# Patient Record
Sex: FEMALE | Race: WHITE | NOT HISPANIC OR LATINO | Employment: STUDENT | URBAN - METROPOLITAN AREA
[De-identification: names, ages, dates, MRNs, and addresses within clinical notes are randomized per-mention and may not be internally consistent; named-entity substitution may affect disease eponyms.]

---
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
## 2020-09-15 ENCOUNTER — OFFICE VISIT (OUTPATIENT)
Dept: URGENT CARE | Facility: CLINIC | Age: 20
End: 2020-09-15
Payer: COMMERCIAL

## 2020-09-15 VITALS
HEART RATE: 125 BPM | TEMPERATURE: 98 F | DIASTOLIC BLOOD PRESSURE: 66 MMHG | SYSTOLIC BLOOD PRESSURE: 117 MMHG | OXYGEN SATURATION: 96 %

## 2020-09-15 DIAGNOSIS — N39.0 URINARY TRACT INFECTION WITH HEMATURIA, SITE UNSPECIFIED: Primary | ICD-10-CM

## 2020-09-15 DIAGNOSIS — R31.9 URINARY TRACT INFECTION WITH HEMATURIA, SITE UNSPECIFIED: Primary | ICD-10-CM

## 2020-09-15 LAB
B-HCG UR QL: NEGATIVE
BILIRUB UR QL STRIP: NEGATIVE
CTP QC/QA: YES
GLUCOSE UR QL STRIP: NEGATIVE
KETONES UR QL STRIP: POSITIVE
LEUKOCYTE ESTERASE UR QL STRIP: POSITIVE
PH, POC UA: 5.5 (ref 5–8)
POC BLOOD, URINE: POSITIVE
POC NITRATES, URINE: NEGATIVE
PROT UR QL STRIP: POSITIVE
SP GR UR STRIP: 1.02 (ref 1–1.03)
UROBILINOGEN UR STRIP-ACNC: NORMAL (ref 0.1–1.1)

## 2020-09-15 PROCEDURE — 99214 PR OFFICE/OUTPT VISIT, EST, LEVL IV, 30-39 MIN: ICD-10-PCS | Mod: 25,S$GLB,, | Performed by: PHYSICIAN ASSISTANT

## 2020-09-15 PROCEDURE — 81003 URINALYSIS AUTO W/O SCOPE: CPT | Mod: QW,S$GLB,, | Performed by: PHYSICIAN ASSISTANT

## 2020-09-15 PROCEDURE — 81003 POCT URINALYSIS, DIPSTICK, AUTOMATED, W/O SCOPE: ICD-10-PCS | Mod: QW,S$GLB,, | Performed by: PHYSICIAN ASSISTANT

## 2020-09-15 PROCEDURE — 99214 OFFICE O/P EST MOD 30 MIN: CPT | Mod: 25,S$GLB,, | Performed by: PHYSICIAN ASSISTANT

## 2020-09-15 RX ORDER — CIPROFLOXACIN 500 MG/1
500 TABLET ORAL 2 TIMES DAILY
Qty: 14 TABLET | Refills: 0 | Status: SHIPPED | OUTPATIENT
Start: 2020-09-15 | End: 2020-09-22

## 2020-09-15 NOTE — PATIENT INSTRUCTIONS
"- Rest.  - Drink plenty of fluids.  - Take Tylenol and/or Ibuprofen as directed as needed for fever/pain.  Do not take more than the recommended dose.  - follow up with your PCP within the next 1-2 weeks as needed.  - You must understand that you have received an Urgent Care treatment only and that you may be released before all of your medical problems are known or treated.   - You, the patient, will arrange for follow up care as instructed.   - If your condition worsens or fails to improve we recommend that you receive another evaluation at the ER immediately or contact your PCP to discuss your concerns.   - You can call [phone] or [phone] to help schedule an appointment with the appropriate provider.      Bladder Infection, Female (Adult)    Urine is normally doesn't have any bacteria in it. But bacteria can get into the urinary tract from the skin around the rectum. Or they can travel in the blood from elsewhere in the body. Once they are in your urinary tract, they can cause infection in the urethra (urethritis), the bladder (cystitis), or the kidneys (pyelonephritis).  The most common place for an infection is in the bladder. This is called a bladder infection. This is one of the most common infections in women. Most bladder infections are easily treated. They are not serious unless the infection spreads to the kidney.  The phrases "bladder infection," "UTI," and "cystitis" are often used to describe the same thing. But they are not always the same. Cystitis is an inflammation of the bladder. The most common cause of cystitis is an infection.  Symptoms  The infection causes inflammation in the urethra and bladder. This causes many of the symptoms. The most common symptoms of a bladder infection are:  · Pain or burning when urinating  · Having to urinate more often than usual  · Urgent need to urinate  · Only a small amount of urine comes out  · Blood in urine  · Abdominal discomfort. This is " usually in the lower abdomen above the pubic bone.  · Cloudy urine  · Strong- or bad-smelling urine  · Unable to urinate (urinary retention)  · Unable to hold urine in (urinary incontinence)  · Fever  · Loss of appetite  · Confusion (in older adults)  Causes  Bladder infections are not contagious. You can't get one from someone else, from a toilet seat, or from sharing a bath.  The most common cause of bladder infections is bacteria from the bowels. The bacteria get onto the skin around the opening of the urethra. From there, they can get into the urine and travel up to the bladder, causing inflammation and infection. This usually happens because of:  · Wiping improperly after urinating. Always wipe from front to back.  · Bowel incontinence  · Pregnancy  · Procedures such as having a catheter inserted  · Older age  · Not emptying your bladder. This can allow bacteria a chance to grow in your urine.  · Dehydration  · Constipation  · Sex  · Use of a diaphragm for birth control   Treatment  Bladder infections are diagnosed by a urine test. They are treated with antibiotics and usually clear up quickly without complications. Treatment helps prevent a more serious kidney infection.  Medicines  Medicines can help in the treatment of a bladder infection:  · Take antibiotics until they are used up, even if you feel better. It is important to finish them to make sure the infection has cleared.  · You can use acetaminophen or ibuprofen for pain, fever, or discomfort, unless another medicine was prescribed. If you have chronic liver or kidney disease, talk with your healthcare provider before using these medicines. Also talk with your provider if you've ever had a stomach ulcer or gastrointestinal bleeding, or are taking blood-thinner medicines.  · If you are given phenazopydridine to reduce burning with urination, it will cause your urine to become a bright orange color. This can stain clothing.  Care and prevention  These  self-care steps can help prevent future infections:  · Drink plenty of fluids to prevent dehydration and flush out your bladder. Do this unless you must restrict fluids for other health reasons, or your doctor told you not to.  · Proper cleaning after going to the bathroom is important. Wipe from front to back after using the toilet to prevent the spread of bacteria.  · Urinate more often. Don't try to hold urine in for a long time.  · Wear loose-fitting clothes and cotton underwear. Avoid tight-fitting pants.  · Improve your diet and prevent constipation. Eat more fresh fruit and vegetables, and fiber, and less junk and fatty foods.  · Avoid sex until your symptoms are gone.  · Avoid caffeine, alcohol, and spicy foods. These can irritate your bladder.  · Urinate right after intercourse to flush out your bladder.  · If you use birth control pills and have frequent bladder infections, discuss it with your doctor.  Follow-up care  Call your healthcare provider if all symptoms are not gone after 3 days of treatment. This is especially important if you have repeat infections.  If a culture was done, you will be told if your treatment needs to be changed. If directed, you can call to find out the results.  If X-rays were done, you will be told if the results will affect your treatment.  Call 911  Call 911 if any of the following occur:  · Trouble breathing  · Hard to wake up or confusion  · Fainting or loss of consciousness  · Rapid heart rate  When to seek medical advice  Call your healthcare provider right away if any of these occur:  · Fever of 100.4ºF (38.0ºC) or higher, or as directed by your healthcare provider  · Symptoms are not better by the third day of treatment  · Back or belly (abdominal) pain that gets worse  · Repeated vomiting, or unable to keep medicine down  · Weakness or dizziness  · Vaginal discharge  · Pain, redness, or swelling in the outer vaginal area (labia)  Date Last Reviewed: 10/1/2016  ©  9706-2089 [employer]. [address] [place] [address], [place] [address]. All rights reserved. This information is not intended as a substitute for professional medical care. Always follow your healthcare professional's instructions.        Urinary Tract Infections in Women    Urinary tract infections (UTIs) are most often caused by bacteria (germs). These bacteria enter the urinary tract. The bacteria may come from outside the body. Or they may travel from the skin outside the rectum or vagina into the urethra. Female anatomy makes it easy for bacteria from the bowel to enter a womans urinary tract, which is the most common source of UTI. This means women develop UTIs more often than men. Pain in or around the urinary tract is a common UTI symptom. But the only way to know for sure if you have a UTI for the healthcare provider to test your urine. The two tests that may be done are the urinalysis and urine culture.  Types of UTIs  · Cystitis: A bladder infection (cystitis) is the most common UTI in women. You may have urgent or frequent urination. You may also have pain, burning when you urinate, and bloody urine.  · Urethritis: This is an inflamed urethra, which is the tube that carries urine from the bladder to outside the body. You may have lower stomach or back pain. You may also have urgent or frequent urination.  · Pyelonephritis: This is a kidney infection. If not treated, it can be serious and damage your kidneys. In severe cases, you may be hospitalized. You may have a fever and lower back pain.  Medicines to treat a UTI  Most UTIs are treated with antibiotics. These kill the bacteria. The length of time you need to take them depends on the type of infection. It may be as short as 3 days. If you have repeated UTIs, a low-dose antibiotic may be needed for several months. Take antibiotics exactly as directed. Dont stop taking them until all of the medicine is gone. If you stop taking the antibiotic  too soon, the infection may not go away, and you may develop a resistance to the antibiotic. This can make it much harder to treat.  Lifestyle changes to treat and prevent UTIs  The lifestyle changes below will help get rid of your UTI. They may also help prevent future UTIs.  · Drink plenty of fluids. This includes water, juice, or other caffeine-free drinks. Fluids help flush bacteria out of your body.  · Empty your bladder. Always empty your bladder when you feel the urge to urinate. And always urinate before going to sleep. Urine that stays in your bladder can lead to infection. Try to urinate before and after sex as well.  · Practice good personal hygiene. Wipe yourself from front to back after using the toilet. This helps keep bacteria from getting into the urethra.  · Use condoms during sex. These help prevent UTIs caused by sexually transmitted bacteria. Also, avoid using spermicides during sex. These can increase the risk of UTIs. Choose other forms of birth control instead. For women who tend to get UTIs after sex, a low-dose of a preventive antibiotic may be used. Be sure to discuss this option with your healthcare provider.  · Follow up with your healthcare provider as directed. He or she may test to make sure the infection has cleared. If needed, more treatment may be started.  Date Last Reviewed: 1/1/2017  © 1263-5792 [employer]. [address] [place] [address], [place] [address]. All rights reserved. This information is not intended as a substitute for professional medical care. Always follow your healthcare professional's instructions.

## 2020-09-15 NOTE — PROGRESS NOTES
Subjective:       Patient ID: Reg Hernandez is a 19 y.o. female.    Vitals:  temperature is 97.9 °F (36.6 °C). Her blood pressure is 117/66 and her pulse is 125 (abnormal). Her oxygen saturation is 96%.     Chief Complaint: Urinary Tract Infection    Patient presents the clinic with a complaint of urinary frequency and right side and flank pain that began yesterday evening.  Patient states approximately 1 month ago she was treated for similar symptoms with antibiotics.  Her symptoms resolved but then returned.  She reports feeling hot and cold last night, but never checked her temperature.    Urinary Tract Infection   This is a new problem. The current episode started yesterday. The problem has been gradually worsening. Associated symptoms include chills, flank pain and frequency. Pertinent negatives include no hematuria, nausea, urgency, vomiting or rash. She has tried NSAIDs and acetaminophen for the symptoms. The treatment provided no relief.       Constitution: Positive for chills. Negative for fever.   Neck: Negative for painful lymph nodes.   Gastrointestinal: Negative for abdominal pain, nausea and vomiting.   Genitourinary: Positive for frequency and flank pain. Negative for dysuria, urgency, urine decreased, hematuria, history of kidney stones, painful menstruation, irregular menstruation, missed menses, heavy menstrual bleeding, ovarian cysts, genital trauma, vaginal pain, vaginal discharge, vaginal bleeding, vaginal odor, painful intercourse, genital sore, painful ejaculation and pelvic pain.   Musculoskeletal: Negative for trauma.   Skin: Negative for rash and lesion.   Hematologic/Lymphatic: Negative for swollen lymph nodes.       Objective:      Physical Exam   Constitutional: She is oriented to person, place, and time. She appears well-developed. No distress.   HENT:   Head: Normocephalic and atraumatic.   Eyes: Conjunctivae are normal.   Cardiovascular: Regular rhythm and normal heart sounds.  Tachycardia present. Exam reveals no gallop and no friction rub.   No murmur heard.     Comments: HR ~ 108 bpm   Pulmonary/Chest: Effort normal and breath sounds normal. No respiratory distress. She has no wheezes.   Abdominal: Soft. Bowel sounds are normal. She exhibits no distension. There is no abdominal tenderness. There is right CVA tenderness. flat abdomen  Musculoskeletal: Normal range of motion.         General: No tenderness.   Neurological: She is alert and oriented to person, place, and time.   Skin: Skin is warm, dry and not diaphoretic. Psychiatric: Her behavior is normal. Judgment and thought content normal.         Results for orders placed or performed in visit on 09/15/20   POCT urine pregnancy   Result Value Ref Range    POC Preg Test, Ur Negative Negative     Acceptable Yes    POCT Urinalysis, Dipstick, Automated, W/O Scope   Result Value Ref Range    POC Blood, Urine Positive (A) Negative    POC Bilirubin, Urine Negative Negative    POC Urobilinogen, Urine normal 0.1 - 1.1    POC Ketones, Urine Positive (A) Negative    POC Protein, Urine Positive (A) Negative    POC Nitrates, Urine Negative Negative    POC Glucose, Urine Negative Negative    pH, UA 5.5 5 - 8    POC Specific Gravity, Urine 1.025 1.003 - 1.029    POC Leukocytes, Urine Positive (A) Negative       Assessment:       1. Urinary tract infection with hematuria, site unspecified        Plan:         Urinary tract infection with hematuria, site unspecified  -     POCT urine pregnancy  -     POCT Urinalysis, Dipstick, Automated, W/O Scope  -     Culture, Urine  -     ciprofloxacin HCl (CIPRO) 500 MG tablet; Take 1 tablet (500 mg total) by mouth 2 (two) times daily. for 7 days  Dispense: 14 tablet; Refill: 0         Reg was seen today for urinary tract infection.    Diagnoses and all orders for this visit:    Urinary tract infection with hematuria, site unspecified  -     POCT urine pregnancy  -     POCT Urinalysis,  "Dipstick, Automated, W/O Scope  -     Culture, Urine  -     ciprofloxacin HCl (CIPRO) 500 MG tablet; Take 1 tablet (500 mg total) by mouth 2 (two) times daily. for 7 days      Patient Instructions   - Rest.  - Drink plenty of fluids.  - Take Tylenol and/or Ibuprofen as directed as needed for fever/pain.  Do not take more than the recommended dose.  - follow up with your PCP within the next 1-2 weeks as needed.  - You must understand that you have received an Urgent Care treatment only and that you may be released before all of your medical problems are known or treated.   - You, the patient, will arrange for follow up care as instructed.   - If your condition worsens or fails to improve we recommend that you receive another evaluation at the ER immediately or contact your PCP to discuss your concerns.   - You can call [phone] or [phone] to help schedule an appointment with the appropriate provider.      Bladder Infection, Female (Adult)    Urine is normally doesn't have any bacteria in it. But bacteria can get into the urinary tract from the skin around the rectum. Or they can travel in the blood from elsewhere in the body. Once they are in your urinary tract, they can cause infection in the urethra (urethritis), the bladder (cystitis), or the kidneys (pyelonephritis).  The most common place for an infection is in the bladder. This is called a bladder infection. This is one of the most common infections in women. Most bladder infections are easily treated. They are not serious unless the infection spreads to the kidney.  The phrases "bladder infection," "UTI," and "cystitis" are often used to describe the same thing. But they are not always the same. Cystitis is an inflammation of the bladder. The most common cause of cystitis is an infection.  Symptoms  The infection causes inflammation in the urethra and bladder. This causes many of the symptoms. The most common symptoms of a bladder infection " are:  · Pain or burning when urinating  · Having to urinate more often than usual  · Urgent need to urinate  · Only a small amount of urine comes out  · Blood in urine  · Abdominal discomfort. This is usually in the lower abdomen above the pubic bone.  · Cloudy urine  · Strong- or bad-smelling urine  · Unable to urinate (urinary retention)  · Unable to hold urine in (urinary incontinence)  · Fever  · Loss of appetite  · Confusion (in older adults)  Causes  Bladder infections are not contagious. You can't get one from someone else, from a toilet seat, or from sharing a bath.  The most common cause of bladder infections is bacteria from the bowels. The bacteria get onto the skin around the opening of the urethra. From there, they can get into the urine and travel up to the bladder, causing inflammation and infection. This usually happens because of:  · Wiping improperly after urinating. Always wipe from front to back.  · Bowel incontinence  · Pregnancy  · Procedures such as having a catheter inserted  · Older age  · Not emptying your bladder. This can allow bacteria a chance to grow in your urine.  · Dehydration  · Constipation  · Sex  · Use of a diaphragm for birth control   Treatment  Bladder infections are diagnosed by a urine test. They are treated with antibiotics and usually clear up quickly without complications. Treatment helps prevent a more serious kidney infection.  Medicines  Medicines can help in the treatment of a bladder infection:  · Take antibiotics until they are used up, even if you feel better. It is important to finish them to make sure the infection has cleared.  · You can use acetaminophen or ibuprofen for pain, fever, or discomfort, unless another medicine was prescribed. If you have chronic liver or kidney disease, talk with your healthcare provider before using these medicines. Also talk with your provider if you've ever had a stomach ulcer or gastrointestinal bleeding, or are taking  blood-thinner medicines.  · If you are given phenazopydridine to reduce burning with urination, it will cause your urine to become a bright orange color. This can stain clothing.  Care and prevention  These self-care steps can help prevent future infections:  · Drink plenty of fluids to prevent dehydration and flush out your bladder. Do this unless you must restrict fluids for other health reasons, or your doctor told you not to.  · Proper cleaning after going to the bathroom is important. Wipe from front to back after using the toilet to prevent the spread of bacteria.  · Urinate more often. Don't try to hold urine in for a long time.  · Wear loose-fitting clothes and cotton underwear. Avoid tight-fitting pants.  · Improve your diet and prevent constipation. Eat more fresh fruit and vegetables, and fiber, and less junk and fatty foods.  · Avoid sex until your symptoms are gone.  · Avoid caffeine, alcohol, and spicy foods. These can irritate your bladder.  · Urinate right after intercourse to flush out your bladder.  · If you use birth control pills and have frequent bladder infections, discuss it with your doctor.  Follow-up care  Call your healthcare provider if all symptoms are not gone after 3 days of treatment. This is especially important if you have repeat infections.  If a culture was done, you will be told if your treatment needs to be changed. If directed, you can call to find out the results.  If X-rays were done, you will be told if the results will affect your treatment.  Call 911  Call 911 if any of the following occur:  · Trouble breathing  · Hard to wake up or confusion  · Fainting or loss of consciousness  · Rapid heart rate  When to seek medical advice  Call your healthcare provider right away if any of these occur:  · Fever of 100.4ºF (38.0ºC) or higher, or as directed by your healthcare provider  · Symptoms are not better by the third day of treatment  · Back or belly (abdominal) pain that gets  worse  · Repeated vomiting, or unable to keep medicine down  · Weakness or dizziness  · Vaginal discharge  · Pain, redness, or swelling in the outer vaginal area (labia)  Date Last Reviewed: 10/1/2016  © 9631-1588 [employer] [address] [place] [address], [place] [address]. All rights reserved. This information is not intended as a substitute for professional medical care. Always follow your healthcare professional's instructions.        Urinary Tract Infections in Women    Urinary tract infections (UTIs) are most often caused by bacteria (germs). These bacteria enter the urinary tract. The bacteria may come from outside the body. Or they may travel from the skin outside the rectum or vagina into the urethra. Female anatomy makes it easy for bacteria from the bowel to enter a womans urinary tract, which is the most common source of UTI. This means women develop UTIs more often than men. Pain in or around the urinary tract is a common UTI symptom. But the only way to know for sure if you have a UTI for the healthcare provider to test your urine. The two tests that may be done are the urinalysis and urine culture.  Types of UTIs  · Cystitis: A bladder infection (cystitis) is the most common UTI in women. You may have urgent or frequent urination. You may also have pain, burning when you urinate, and bloody urine.  · Urethritis: This is an inflamed urethra, which is the tube that carries urine from the bladder to outside the body. You may have lower stomach or back pain. You may also have urgent or frequent urination.  · Pyelonephritis: This is a kidney infection. If not treated, it can be serious and damage your kidneys. In severe cases, you may be hospitalized. You may have a fever and lower back pain.  Medicines to treat a UTI  Most UTIs are treated with antibiotics. These kill the bacteria. The length of time you need to take them depends on the type of infection. It may be as short as 3 days. If you have  repeated UTIs, a low-dose antibiotic may be needed for several months. Take antibiotics exactly as directed. Dont stop taking them until all of the medicine is gone. If you stop taking the antibiotic too soon, the infection may not go away, and you may develop a resistance to the antibiotic. This can make it much harder to treat.  Lifestyle changes to treat and prevent UTIs  The lifestyle changes below will help get rid of your UTI. They may also help prevent future UTIs.  · Drink plenty of fluids. This includes water, juice, or other caffeine-free drinks. Fluids help flush bacteria out of your body.  · Empty your bladder. Always empty your bladder when you feel the urge to urinate. And always urinate before going to sleep. Urine that stays in your bladder can lead to infection. Try to urinate before and after sex as well.  · Practice good personal hygiene. Wipe yourself from front to back after using the toilet. This helps keep bacteria from getting into the urethra.  · Use condoms during sex. These help prevent UTIs caused by sexually transmitted bacteria. Also, avoid using spermicides during sex. These can increase the risk of UTIs. Choose other forms of birth control instead. For women who tend to get UTIs after sex, a low-dose of a preventive antibiotic may be used. Be sure to discuss this option with your healthcare provider.  · Follow up with your healthcare provider as directed. He or she may test to make sure the infection has cleared. If needed, more treatment may be started.  Date Last Reviewed: 1/1/2017  © 1216-1632 [employer]. [address] [place] [address], [place] [address]. All rights reserved. This information is not intended as a substitute for professional medical care. Always follow your healthcare professional's instructions.

## 2020-09-18 ENCOUNTER — TELEPHONE (OUTPATIENT)
Dept: URGENT CARE | Facility: CLINIC | Age: 20
End: 2020-09-18

## 2020-09-18 LAB
BACTERIA UR CULT: ABNORMAL
BACTERIA UR CULT: ABNORMAL
OTHER ANTIBIOTIC SUSC ISLT: ABNORMAL

## 2020-10-13 NOTE — PROGRESS NOTES
Subjective:       Reg Hernandez is a 19 y.o. female who is an established patient who was self-referred for evaluation of UTIs.      She reports frequent UTIs. +sexually active though symptoms started prior to onset of sexual activity. Denies childhood UTIs.    She was seen in 10/18 by Urgent Care NP for dysuria and UTI symptoms. No UCx was sent (UA macro +LE, RBCs). Given Macrobid x 7d.     UTI symptoms are nocturia and dysuria. She feels this happens every 2 days x 1 year.  She has symptoms currently but UA clear. Dysuria will improve with Azo and cranberry juice. Nocturia x 0 usually, on bad days x 4-5 at night, happens about one a week. Denies UI, ZITA.     Reports she has stopped baths and reduced soda intake.     PVR (bladder scan) today - 0cc    VANIA 7/19 - wnl, PVR 1cc    10/25/2020  Returns now with c/o recent UTIs. Treated for UTI in 9/20. Reports more issues with rUTIs. Sensitive to Cipro but states that did not get better. Then given Keflex from  as symptoms persisted (UCx was negative). States she had fever, pain R flank, myalgias, weak, dizzy. Some frequency, no dysuria.  Taking Uribel PRN, helps when symptoms coming on.      UCx:   8/20 - treated for UTI at outside   9/15/20 - >100k E coli (Cipro)  9/17/20 - negative (given Keflex)    The following portions of the patient's history were reviewed and updated as appropriate: allergies, current medications, past family history, past medical history, past social history, past surgical history and problem list.    Review of Systems  Constitutional: no fever or chills  ENT: no nasal congestion or sore throat  Respiratory: no cough or shortness of breath  Cardiovascular: no chest pain or palpitations  Gastrointestinal: no nausea or vomiting, tolerating diet  Genitourinary: as per HPI  Hematologic/Lymphatic: no easy bruising or lymphadenopathy  Musculoskeletal: no arthralgias or myalgias  Skin: no rashes or lesions  Neurological: no seizures or  "tremors  Behavioral/Psych: no auditory or visual hallucinations        Objective:    Vitals: Ht 5' 5" (1.651 m)   Wt 64 kg (141 lb)   BMI 23.46 kg/m²     Physical Exam   General: well developed, well nourished in no acute distress  Head: normocephalic, atraumatic  Neck: supple, trachea midline, no obvious enlargement of thyroid  HEENT: EOMI, mucus membranes moist, sclera anicteric, no hearing impairment  Lungs: symmetric expansion, non-labored breathing  Cardiovascular: regular rate and rhythm, normal pulses  Abdomen: soft, non tender, non distended, no palpable masses, no hepatosplenomegaly, no hernias, no CVA tenderness  Musculoskeletal: no peripheral edema, normal ROM in bilateral upper and lower extremities  Lymphatics: no cervical or inguinal lymphadenopathy  Skin: no rashes or lesions  Neuro: alert and oriented x 3, no gross deficits  Psych: normal judgment and insight, normal mood/affect and non-anxious  Genitourinary:   patient declined exam      Lab Review   Urine analysis today in clinic shows - no urine     Lab Results   Component Value Date    WBC 8.19 12/30/2004    HGB 12.8 04/26/2007    HCT 37.8 12/30/2004    MCV 81.3 12/30/2004     12/30/2004     No results found for: CREATININE, BUN    Imaging  NA       Assessment/Plan:      1. Recurrent UTI    - Discussed UTI prevention strategies.   - Adequate hydration.   - Double voiding. Consider timed voiding.    - Avoid constipation.   - VANIA with PVR to monitor upper tracts 7/19 - wnl   - Cystoscopy - okay to hold   - Cranberry/probiotics/D-mannose   - Call with UTI symptoms so UA/UCx can be sent.    - Discussed CT uro, cysto as further workup - will hold for now. Will proceed if UTIs return.     2. Dysuria    - Uribel PRN   - Discussed bladder irritants. Recommend elimination diet.    - PBS?         Follow up in 2-3 months         "

## 2020-10-26 ENCOUNTER — OFFICE VISIT (OUTPATIENT)
Dept: UROLOGY | Facility: CLINIC | Age: 20
End: 2020-10-26
Payer: COMMERCIAL

## 2020-10-26 VITALS — BODY MASS INDEX: 23.49 KG/M2 | WEIGHT: 141 LBS | HEIGHT: 65 IN

## 2020-10-26 DIAGNOSIS — N39.0 RECURRENT UTI: Primary | ICD-10-CM

## 2020-10-26 DIAGNOSIS — R30.0 DYSURIA: ICD-10-CM

## 2020-10-26 PROCEDURE — 99214 OFFICE O/P EST MOD 30 MIN: CPT | Mod: S$GLB,,, | Performed by: UROLOGY

## 2020-10-26 PROCEDURE — 99999 PR PBB SHADOW E&M-EST. PATIENT-LVL III: ICD-10-PCS | Mod: PBBFAC,,, | Performed by: UROLOGY

## 2020-10-26 PROCEDURE — 99214 PR OFFICE/OUTPT VISIT, EST, LEVL IV, 30-39 MIN: ICD-10-PCS | Mod: S$GLB,,, | Performed by: UROLOGY

## 2020-10-26 PROCEDURE — 3008F PR BODY MASS INDEX (BMI) DOCUMENTED: ICD-10-PCS | Mod: CPTII,S$GLB,, | Performed by: UROLOGY

## 2020-10-26 PROCEDURE — 99999 PR PBB SHADOW E&M-EST. PATIENT-LVL III: CPT | Mod: PBBFAC,,, | Performed by: UROLOGY

## 2020-10-26 PROCEDURE — 3008F BODY MASS INDEX DOCD: CPT | Mod: CPTII,S$GLB,, | Performed by: UROLOGY

## 2020-10-27 ENCOUNTER — TELEPHONE (OUTPATIENT)
Dept: UROLOGY | Facility: CLINIC | Age: 20
End: 2020-10-27

## 2020-10-27 NOTE — TELEPHONE ENCOUNTER
Uribel sent to [facility].     She can also look at goodrx.com or look on [employer] for coupon.

## 2020-10-27 NOTE — TELEPHONE ENCOUNTER
----- Message from Nancy El MA sent at 10/27/2020  2:45 PM CDT -----    ----- Message -----  From: Jaye Wallace  Sent: 10/27/2020   1:47 PM CDT  To: Ashley Alvarez Staff    Type: Patient Call Back    Who called:  Self     What is the request in detail: patient's medication is to expensive , She would like to know if it can be called to [facility] where it may be cheaper    methen-m.blue-s.phos-phsal-hyo (URIBEL) 118-10-40.8-36 mg Cap    [facility] - [address] [phone] (Phone)  [phone] (Fax)        Can the clinic reply by [facility]? No     Would the patient rather a call back or a response via [facility]?  Call     Best call back number:.[phone] (home)

## 2020-10-27 NOTE — TELEPHONE ENCOUNTER
lvm informing pt that her prescription was sent to [facility] and that she can look on [email or web address] for a savings coupon. -Nancy

## 2023-05-30 ENCOUNTER — HOSPITAL ENCOUNTER (EMERGENCY)
Facility: HOSPITAL | Age: 23
Discharge: HOME | End: 2023-05-31
Attending: EMERGENCY MEDICINE
Payer: COMMERCIAL

## 2023-05-30 DIAGNOSIS — T63.481A LOCAL REACTION TO INSECT STING, ACCIDENTAL OR UNINTENTIONAL, INITIAL ENCOUNTER: Primary | ICD-10-CM

## 2023-05-30 DIAGNOSIS — L03.116 CELLULITIS OF LEFT FOOT EXCLUDING TOES: ICD-10-CM

## 2023-05-30 DIAGNOSIS — M79.89 SWELLING OF LEFT FOOT: ICD-10-CM

## 2023-05-30 LAB
B-HCG UR QL: NEGATIVE
CTP QC/QA: YES

## 2023-05-30 PROCEDURE — 81025 URINE PREGNANCY TEST: CPT | Mod: ER | Performed by: EMERGENCY MEDICINE

## 2023-05-30 PROCEDURE — 99284 EMERGENCY DEPT VISIT MOD MDM: CPT | Mod: ER

## 2023-05-30 NOTE — Clinical Note
"Reg Palaciosr" David was seen and treated in our emergency department on 5/30/2023.  She may return to work on 06/05/2023.       If you have any questions or concerns, please don't hesitate to call.      Charlene Johnson MD"

## 2023-05-31 VITALS
DIASTOLIC BLOOD PRESSURE: 71 MMHG | TEMPERATURE: 98 F | WEIGHT: 141 LBS | HEIGHT: 65 IN | HEART RATE: 79 BPM | RESPIRATION RATE: 19 BRPM | OXYGEN SATURATION: 100 % | SYSTOLIC BLOOD PRESSURE: 110 MMHG | BODY MASS INDEX: 23.49 KG/M2

## 2023-05-31 PROCEDURE — 63600175 PHARM REV CODE 636 W HCPCS: Mod: ER | Performed by: EMERGENCY MEDICINE

## 2023-05-31 PROCEDURE — 96372 THER/PROPH/DIAG INJ SC/IM: CPT | Performed by: EMERGENCY MEDICINE

## 2023-05-31 RX ORDER — PREDNISONE 20 MG/1
40 TABLET ORAL DAILY
Qty: 10 TABLET | Refills: 0 | Status: SHIPPED | OUTPATIENT
Start: 2023-05-31 | End: 2023-06-05

## 2023-05-31 RX ORDER — LORATADINE 10 MG/1
10 TABLET ORAL EVERY MORNING
Qty: 7 TABLET | Refills: 0 | Status: SHIPPED | OUTPATIENT
Start: 2023-05-31 | End: 2023-06-07

## 2023-05-31 RX ORDER — FAMOTIDINE 20 MG/1
20 TABLET, FILM COATED ORAL 2 TIMES DAILY
Qty: 14 TABLET | Refills: 0 | Status: SHIPPED | OUTPATIENT
Start: 2023-05-31 | End: 2023-06-07

## 2023-05-31 RX ORDER — DEXAMETHASONE SODIUM PHOSPHATE 4 MG/ML
8 INJECTION, SOLUTION INTRA-ARTICULAR; INTRALESIONAL; INTRAMUSCULAR; INTRAVENOUS; SOFT TISSUE
Status: COMPLETED | OUTPATIENT
Start: 2023-05-31 | End: 2023-05-31

## 2023-05-31 RX ORDER — DOXYCYCLINE 100 MG/1
100 CAPSULE ORAL EVERY 12 HOURS
Qty: 20 CAPSULE | Refills: 0 | Status: SHIPPED | OUTPATIENT
Start: 2023-05-31 | End: 2023-06-10

## 2023-05-31 RX ADMIN — DEXAMETHASONE SODIUM PHOSPHATE 8 MG: 4 INJECTION INTRA-ARTICULAR; INTRALESIONAL; INTRAMUSCULAR; INTRAVENOUS; SOFT TISSUE at 12:05

## 2023-05-31 NOTE — ED NOTES
PT REPORTS POSSIBLY GETTING BIT BY AN ISNSECT ON YESTERDAY. DENIES PAIN TO SITE. TOP OF LEFT ANTERIOR FOOT NOTED RED AND SWOLLEN. REPORTS ICING AREA AND TAKING BENADRYL THREE TIMES TODAY.

## 2023-05-31 NOTE — ED PROVIDER NOTES
Encounter Date: 5/30/2023    SCRIBE #1 NOTE: I, RENETTA Manriquez, am scribing for, and in the presence of,  Charlene Johnson MD. I have scribed the following portions of the note - Other sections scribed: HPI, ROS, PE.     History     Chief Complaint   Patient presents with    Insect Bite     Pt has swollen L foot from insect bite yesterday; has taken 3 doses of otc Benadryl     Reg Hernandez is a 22 y.o. female, with no pertinent medical problems, who presents to the ED with acute left foot redness/swelling which started 1 day ago. Patient believes she was bitten by a sandfly. She attempted treatment with a Benadryl then took a nap. Upon waking up, pt noticed symptoms had persisted. She also noticed that toes would turn purple if not elevated. Symptoms are exacerbated with ambulation. Denies fever or itching. Pt is concerned because she stands for long periods at her job.    The history is provided by the patient. No  was used.   Review of patient's allergies indicates:   Allergen Reactions    Amoxicillin Hives     Patient is allergic to all cillins    Clindamycin      Unable to obtain      Peaches [peach (prunus persica)] Hives    Penicillins Hives     Patient is allergic to all cillins      Sulfa (sulfonamide antibiotics) Rash     Past Medical History:   Diagnosis Date    ADHD (attention deficit hyperactivity disorder)     Allergy     GERD (gastroesophageal reflux disease)     History of methicillin resistant Staph aureus     History of viral warts     on right elbow    Hypoxia     Multiple food allergies     Otitis media     Sleep apnea      Past Surgical History:   Procedure Laterality Date    ADENOIDECTOMY      TONSILLECTOMY      TYMPANOSTOMY TUBE PLACEMENT       Family History   Problem Relation Age of Onset    Hyperlipidemia Mother     Diabetes Mother      Social History     Tobacco Use    Smoking status: Every Day     Types: Vaping with nicotine    Smokeless tobacco: Never   Substance  Use Topics    Alcohol use: No    Drug use: No     Review of Systems   Constitutional:  Negative for fever.   Cardiovascular:  Negative for leg swelling.   Musculoskeletal:  Negative for arthralgias, gait problem and joint swelling.   Skin:  Positive for color change (redness).   Neurological:  Negative for weakness and numbness.   All other systems reviewed and are negative.    Physical Exam     Initial Vitals [05/30/23 2022]   BP Pulse Resp Temp SpO2   120/79 100 16 98.3 °F (36.8 °C) 98 %      MAP       --         Physical Exam    Nursing note and vitals reviewed.  Constitutional: She appears well-developed and well-nourished.   HENT:   Head: Normocephalic and atraumatic.   Right Ear: External ear normal.   Left Ear: External ear normal.   Eyes: Conjunctivae are normal.   Neck: Phonation normal. Neck supple.   Normal range of motion.  Cardiovascular:  Normal rate and intact distal pulses.           Pulmonary/Chest: Effort normal. No stridor. No respiratory distress.   Abdominal: Abdomen is soft. There is no abdominal tenderness.   Musculoskeletal:         General: No tenderness or edema.      Cervical back: Normal range of motion and neck supple.     Neurological: She is alert and oriented to person, place, and time. She has normal strength. Gait normal.   Skin: Skin is warm and dry. No rash noted.   Erythema and edema to dorsal aspect of left foot which goes up to ankle. Non tender. Normal ROM and capillary refill. Skin intact. Sensation normal. Left DP pulse 2+.   Psychiatric: She has a normal mood and affect. Her behavior is normal.       ED Course   Procedures  Labs Reviewed   POCT URINE PREGNANCY          Imaging Results    None          Medications   dexAMETHasone injection 8 mg (8 mg Intramuscular Given 5/31/23 0014)     Medical Decision Making:   History:   Old Medical Records: I decided to obtain old medical records.  Clinical Tests:   Lab Tests: Ordered and Reviewed        Scribe Attestation:   Scribe #1:  I performed the above scribed service and the documentation accurately describes the services I performed. I attest to the accuracy of the note.                 Labs Reviewed    Admission on 2023, Discharged on 2023   Component Date Value Ref Range Status    POC Preg Test, Ur 2023 Negative  Negative Final     Acceptable 2023 Yes   Final        Imaging Reviewed    Imaging Results    None         Medications given in ED    Medications   dexAMETHasone injection 8 mg (8 mg Intramuscular Given 23 0014)         Note was created using voice recognition software. Note may have occasional typographical errors that may not have been identified and edited despite good liu initial review prior to signing.    I, Charlene Johnson MD, personally performed the services described in this documentation. All medical record entries made by the scribe were at my direction and in my presence.  I have reviewed the chart and agree that the record reflects my personal performance and is accurate and complete.      Clinical Impression:   Final diagnoses:  [T63.481A] Local reaction to insect sting, accidental or unintentional, initial encounter (Primary)  [M79.89] Swelling of left foot  [L03.116] Cellulitis of left foot excluding toes        ED Disposition Condition    Discharge Stable          ED Prescriptions       Medication Sig Dispense Start Date End Date Auth. Provider    doxycycline (MONODOX) 100 MG capsule () Take 1 capsule (100 mg total) by mouth every 12 (twelve) hours. for 10 days 20 capsule 2023 6/10/2023 Charlene Johnson MD    predniSONE (DELTASONE) 20 MG tablet () Take 2 tablets (40 mg total) by mouth once daily. for 5 days 10 tablet 2023 Charlene Johnson MD    famotidine (PEPCID) 20 MG tablet () Take 1 tablet (20 mg total) by mouth 2 (two) times daily. for 7 days 14 tablet 2023 Charlene Johnson MD    loratadine (CLARITIN) 10 mg tablet  () Take 1 tablet (10 mg total) by mouth every morning. for 7 days 7 tablet 2023 Charlene Johnson MD          Follow-up Information       Follow up With Specialties Details Why Contact Info    The nearest emergency department.  Go to  As needed, If symptoms worsen     Astrid Blackwell MD Family Medicine, Wound Care Call in 1 day to schedule an appointment, for re-evaluation of today's complaint, and ongoing care [address] [facility] [place] [address]  [phone]               Charlene Johnson MD  [phone]